# Patient Record
Sex: MALE | Race: WHITE | NOT HISPANIC OR LATINO | Employment: FULL TIME | URBAN - METROPOLITAN AREA
[De-identification: names, ages, dates, MRNs, and addresses within clinical notes are randomized per-mention and may not be internally consistent; named-entity substitution may affect disease eponyms.]

---

## 2021-09-02 ENCOUNTER — OFFICE VISIT (OUTPATIENT)
Dept: NEUROSURGERY | Facility: CLINIC | Age: 51
End: 2021-09-02

## 2021-09-02 VITALS
DIASTOLIC BLOOD PRESSURE: 68 MMHG | WEIGHT: 297 LBS | SYSTOLIC BLOOD PRESSURE: 124 MMHG | HEIGHT: 71 IN | BODY MASS INDEX: 41.58 KG/M2

## 2021-09-02 DIAGNOSIS — M48.02 SPINAL STENOSIS IN CERVICAL REGION: Primary | ICD-10-CM

## 2021-09-02 PROCEDURE — 99204 OFFICE O/P NEW MOD 45 MIN: CPT | Performed by: STUDENT IN AN ORGANIZED HEALTH CARE EDUCATION/TRAINING PROGRAM

## 2021-09-02 RX ORDER — METOPROLOL TARTRATE 50 MG/1
50 TABLET, FILM COATED ORAL 2 TIMES DAILY WITH MEALS
COMMUNITY
Start: 2021-08-09

## 2021-09-02 NOTE — PROGRESS NOTES
Patient: Leighton Lovelace  : 1970    Primary Care Provider: Keara Vasquez MD    Requesting Provider: As above      Chief Complaint: Neck Pain, Headache (right-sided), and Shoulder Pain (left shoulder (previous injury to left shoulder) )      History of Present Illness: This is a 51 y.o. male who presents after undergoing a cervical MRI for occipital headaches.  The patient endorsed that approximately 1 month ago he had sharp shooting occipital headaches.  These resolved with ibuprofen use, but he did undergo a cervical MRI as a result.  This showed multilevel degenerative degenerative changes with stenosis.  He was subsequently referred to our clinic for evaluation.  In talking the patient, he endorses significant improvement in the initial headache episodes.  He has longstanding left shoulder pain from an  injury.  Additionally, he has longstanding chronic neck pain in the posterior aspect of his neck which he feels causes neck stiffness.  He also has pain in the left shoulder blade that radiates into the back.  He denies any pain radiating down his arm. He denies any upper extremity weakness.  He denies any weakness in his hands or dysfunction of his hands.  He denies any issues with his gait or bowel or bladder dysfunction.    PMHX  Allergies:  Allergies   Allergen Reactions   • Penicillins Hives and Other (See Comments)     Childhood allergy     Medications    Current Outpatient Medications:   •  metoprolol tartrate (LOPRESSOR) 50 MG tablet, Take 50 mg by mouth 2 (Two) Times a Day With Meals., Disp: , Rfl:   Past Medical History:  Past Medical History:   Diagnosis Date   • Hypertension      Past Surgical History:  Past Surgical History:   Procedure Laterality Date   • VASECTOMY       Social Hx:  Social History     Tobacco Use   • Smoking status: Former Smoker     Quit date: 2006     Years since quitting: 15.0   • Smokeless tobacco: Former User   Substance Use Topics   • Alcohol use: Yes   •  Drug use: Yes     Types: Marijuana     Family Hx:  Family History   Problem Relation Age of Onset   • Hypertension Other      Review of Systems:        Review of Systems   Constitutional: Negative for activity change, appetite change, chills, diaphoresis, fatigue, fever and unexpected weight change.   HENT: Positive for ear pain and tinnitus. Negative for congestion, dental problem, drooling, ear discharge, facial swelling, hearing loss, mouth sores, nosebleeds, postnasal drip, rhinorrhea, sinus pressure, sneezing, sore throat, trouble swallowing and voice change.    Eyes: Negative for photophobia, pain, discharge, redness, itching and visual disturbance.   Respiratory: Positive for shortness of breath. Negative for apnea, cough, choking, chest tightness, wheezing and stridor.    Cardiovascular: Negative for chest pain, palpitations and leg swelling.   Gastrointestinal: Negative for abdominal distention, abdominal pain, anal bleeding, blood in stool, constipation, diarrhea, nausea, rectal pain and vomiting.   Endocrine: Negative for cold intolerance, heat intolerance, polydipsia, polyphagia and polyuria.   Genitourinary: Negative for decreased urine volume, difficulty urinating, dysuria, enuresis, flank pain, frequency, genital sores, hematuria and urgency.   Musculoskeletal: Negative for arthralgias, back pain, gait problem, joint swelling, myalgias, neck pain and neck stiffness.   Skin: Negative for color change, pallor, rash and wound.   Allergic/Immunologic: Negative for environmental allergies, food allergies and immunocompromised state.   Neurological: Negative for dizziness, tremors, seizures, syncope, facial asymmetry, speech difficulty, weakness, light-headedness, numbness and headaches.   Hematological: Negative for adenopathy. Does not bruise/bleed easily.   Psychiatric/Behavioral: Negative for agitation, behavioral problems, confusion, decreased concentration, dysphoric mood, hallucinations, self-injury,  "sleep disturbance and suicidal ideas. The patient is not nervous/anxious and is not hyperactive.    All other systems reviewed and are negative.       Physical Exam:   /68 (BP Location: Left arm, Patient Position: Sitting, Cuff Size: Adult)   Ht 180.3 cm (71\")   Wt 135 kg (297 lb)   BMI 41.42 kg/m²   Awake, alert and oriented x 3  Speech f/c  Opens eyes spont  Pupils 3 mm rx bilaterally  EOMI  Normal sensation to light touch in all 3 distributions of CN V bilaterally  FS  TML  5/5 in all 4 ext  Normal sensation to light touch in all 4 ext  2+DTR's  Vinh's bilaterally  No PD or dysmetria  Gait not assessed    Diagnostic Studies:  All neurological imaging studies were independently reviewed unless stated otherwise    Assessment/Plan:  This is a 51 y.o. male presenting episodes of occipital headaches which prompted a cervical MRI.  With regards the patient's occipital headaches, they have significantly improved with ibuprofen.  I do not see anything on his cervical MRI that would explain those headaches.  The patient does have significant cervical stenosis from C3-5 with the worst being at the C4-5 disc base.  In talking the patient, however, he denies any symptoms of cervical radiculopathy or myelopathy.  He has no arm pain, arm weakness, dysfunction of his hands, or gait instability.  The patient does have a Vinh sign bilaterally on physical exam, otherwise he has no other focal neurological deficits.  I think the patient is essentially asymptomatic from the cervical stenosis.  I explained to the patient that given his young age, this is likely to progress over time and if he were to develop further objective findings of myelopathy or subjective findings of myelopathy, I think surgical intervention would be appropriate.  At this time, however since he is asymptomatic, we will continue to monitor conservatively.  I told the patient to give me a call if he develops any worsening arm pain, arm weakness, " or other signs of myelopathy.

## 2022-01-15 PROCEDURE — U0004 COV-19 TEST NON-CDC HGH THRU: HCPCS | Performed by: NURSE PRACTITIONER

## 2023-03-20 ENCOUNTER — TRANSCRIBE ORDERS (OUTPATIENT)
Dept: ADMINISTRATIVE | Facility: HOSPITAL | Age: 53
End: 2023-03-20
Payer: COMMERCIAL

## 2023-03-20 DIAGNOSIS — E11.9 DIABETES MELLITUS WITHOUT COMPLICATION: Primary | ICD-10-CM

## 2023-03-21 ENCOUNTER — TRANSCRIBE ORDERS (OUTPATIENT)
Dept: ADMINISTRATIVE | Facility: HOSPITAL | Age: 53
End: 2023-03-21
Payer: COMMERCIAL

## 2023-03-21 DIAGNOSIS — E11.9 DIABETES MELLITUS WITHOUT COMPLICATION: Primary | ICD-10-CM
